# Patient Record
Sex: MALE | Race: WHITE | Employment: FULL TIME | URBAN - METROPOLITAN AREA
[De-identification: names, ages, dates, MRNs, and addresses within clinical notes are randomized per-mention and may not be internally consistent; named-entity substitution may affect disease eponyms.]

---

## 2019-05-03 ENCOUNTER — TELEPHONE (OUTPATIENT)
Dept: ADMINISTRATIVE | Age: 29
End: 2019-05-03

## 2019-06-03 ENCOUNTER — TELEPHONE (OUTPATIENT)
Dept: PRIMARY CARE CLINIC | Age: 29
End: 2019-06-03

## 2019-07-01 ENCOUNTER — OFFICE VISIT (OUTPATIENT)
Dept: PRIMARY CARE CLINIC | Age: 29
End: 2019-07-01
Payer: COMMERCIAL

## 2019-07-01 VITALS
HEIGHT: 76 IN | HEART RATE: 98 BPM | SYSTOLIC BLOOD PRESSURE: 122 MMHG | BODY MASS INDEX: 18.88 KG/M2 | WEIGHT: 155 LBS | OXYGEN SATURATION: 98 % | DIASTOLIC BLOOD PRESSURE: 60 MMHG

## 2019-07-01 DIAGNOSIS — F41.9 ANXIETY: Primary | ICD-10-CM

## 2019-07-01 DIAGNOSIS — Z00.00 HEALTH MAINTENANCE EXAMINATION: ICD-10-CM

## 2019-07-01 DIAGNOSIS — J45.20 MILD INTERMITTENT ASTHMA WITHOUT COMPLICATION: ICD-10-CM

## 2019-07-01 DIAGNOSIS — J30.89 SEASONAL ALLERGIC RHINITIS DUE TO OTHER ALLERGIC TRIGGER: ICD-10-CM

## 2019-07-01 DIAGNOSIS — Z80.0 FAMILY HISTORY OF COLON CANCER: ICD-10-CM

## 2019-07-01 PROBLEM — J30.9 ALLERGIC RHINITIS DUE TO ALLERGEN: Status: ACTIVE | Noted: 2019-07-01

## 2019-07-01 PROCEDURE — 99214 OFFICE O/P EST MOD 30 MIN: CPT | Performed by: FAMILY MEDICINE

## 2019-07-01 RX ORDER — FLUOXETINE 10 MG/1
10 CAPSULE ORAL DAILY
Qty: 30 CAPSULE | Refills: 1 | Status: SHIPPED | OUTPATIENT
Start: 2019-07-01 | End: 2019-07-29 | Stop reason: SDUPTHER

## 2019-07-01 RX ORDER — ALBUTEROL SULFATE 90 UG/1
AEROSOL, METERED RESPIRATORY (INHALATION)
Qty: 1 INHALER | Refills: 0 | Status: SHIPPED | OUTPATIENT
Start: 2019-07-01

## 2019-07-01 NOTE — ASSESSMENT & PLAN NOTE
He is concern for asthma versus panic attacks. Other differential reviewed. Declines PPD, chest imaging, cardiac testing, pulmonary referral or PFTs now. Would like to start fluoxetine as above and a trial of albuterol as directed as needed. Differential reviewed. Precautions reviewed.

## 2019-07-01 NOTE — ASSESSMENT & PLAN NOTE
Mom was in her early 46s among other family members later. Is going to plan anoscopy around age 34 and less symptoms. Signs and symptoms watch were reviewed. Declines FIT cards.

## 2019-07-01 NOTE — PROGRESS NOTES
or exudate. Posterior pharynx is normal.  Neck: Supple. Palpation reveals no adenopathy. No masses appreciated. No JVD. Carotids: no  bruits. Resp: Respirations are unlabored. Clear to auscultation. No rales, rhonchi or wheezes appreciated  over the lungs bilaterally. CV: Rate is regular. Rhythm is regular. No gallop or rubs. No heart murmur appreciated. Extremities: No clubbing, cyanosis, or edema. No calf inflammation or tenderness. Abdomen: Bowel sounds are normoactive. Abdomen is soft, nontender, and nondistended. No  abdominal masses. No palpable hepatosplenomegaly. Lymph: No palpable or visible regional lymphadenopathy. Musculoskeletal: no acute joint inflammation. Skin: Dry and warm with no rash. Skin normal to inspection and palpation overall. Neuro: Alert and oriented. Affect: appropriate. Upper Extremities: 5/5 bilaterally. Lower Extremities:  5/5 bilaterally. Sensation intact to light touch. Reflexes: DTR's are symmetric and 2+ bilaterally. .  Cranial Nerves: Cranial nerves grossly intact. Assessment and Plan:   Diagnosis Orders   1. Anxiety  CBC Auto Differential    Comprehensive Metabolic Panel    TSH without Reflex    T4, Free    Urinalysis    FLUoxetine (PROZAC) 10 MG capsule   2. Mild intermittent asthma without complication  CBC Auto Differential    Comprehensive Metabolic Panel    TSH without Reflex    T4, Free    Urinalysis    albuterol sulfate  (90 Base) MCG/ACT inhaler   3. Health maintenance examination  CBC Auto Differential    Comprehensive Metabolic Panel    TSH without Reflex    T4, Free    Urinalysis   4. Family history of colon cancer     5. Seasonal allergic rhinitis due to other allergic trigger         Anxiety  Anxiety and OCD. Did very well on low-dose fluoxetine in the past.  He would like to restart 10 mg with precautions. We will titrate as necessary. Risks reviewed including paradoxical effect, sexual, prolonged QT, SIADH.   He finds counseling

## 2019-07-26 ENCOUNTER — HOSPITAL ENCOUNTER (OUTPATIENT)
Age: 29
Discharge: HOME | End: 2019-07-28
Payer: COMMERCIAL

## 2019-07-26 DIAGNOSIS — Z00.00 HEALTH MAINTENANCE EXAMINATION: ICD-10-CM

## 2019-07-26 DIAGNOSIS — F41.9 ANXIETY: ICD-10-CM

## 2019-07-26 DIAGNOSIS — J45.20 MILD INTERMITTENT ASTHMA WITHOUT COMPLICATION: ICD-10-CM

## 2019-07-26 LAB
ALBUMIN SERPL-MCNC: 4.6 G/DL (ref 3.5–5.2)
ALP BLD-CCNC: 44 U/L (ref 40–129)
ALT SERPL-CCNC: 16 U/L (ref 0–40)
ANION GAP SERPL CALCULATED.3IONS-SCNC: 17 MMOL/L (ref 7–16)
AST SERPL-CCNC: 16 U/L (ref 0–39)
BASOPHILS ABSOLUTE: 0.03 E9/L (ref 0–0.2)
BASOPHILS RELATIVE PERCENT: 0.6 % (ref 0–2)
BILIRUB SERPL-MCNC: 0.6 MG/DL (ref 0–1.2)
BILIRUBIN URINE: NEGATIVE
BLOOD, URINE: NEGATIVE
BUN BLDV-MCNC: 12 MG/DL (ref 6–20)
CALCIUM SERPL-MCNC: 9.4 MG/DL (ref 8.6–10.2)
CHLORIDE BLD-SCNC: 101 MMOL/L (ref 98–107)
CLARITY: CLEAR
CO2: 23 MMOL/L (ref 22–29)
COLOR: YELLOW
CREAT SERPL-MCNC: 1 MG/DL (ref 0.7–1.2)
EOSINOPHILS ABSOLUTE: 0.24 E9/L (ref 0.05–0.5)
EOSINOPHILS RELATIVE PERCENT: 4.6 % (ref 0–6)
GFR AFRICAN AMERICAN: >60
GFR NON-AFRICAN AMERICAN: >60 ML/MIN/1.73
GLUCOSE BLD-MCNC: 99 MG/DL (ref 74–99)
GLUCOSE URINE: NEGATIVE MG/DL
HCT VFR BLD CALC: 46.2 % (ref 37–54)
HEMOGLOBIN: 15.4 G/DL (ref 12.5–16.5)
IMMATURE GRANULOCYTES #: 0.01 E9/L
IMMATURE GRANULOCYTES %: 0.2 % (ref 0–5)
KETONES, URINE: NEGATIVE MG/DL
LEUKOCYTE ESTERASE, URINE: NEGATIVE
LYMPHOCYTES ABSOLUTE: 2.26 E9/L (ref 1.5–4)
LYMPHOCYTES RELATIVE PERCENT: 43.5 % (ref 20–42)
MCH RBC QN AUTO: 31.9 PG (ref 26–35)
MCHC RBC AUTO-ENTMCNC: 33.3 % (ref 32–34.5)
MCV RBC AUTO: 95.7 FL (ref 80–99.9)
MONOCYTES ABSOLUTE: 0.57 E9/L (ref 0.1–0.95)
MONOCYTES RELATIVE PERCENT: 11 % (ref 2–12)
NEUTROPHILS ABSOLUTE: 2.08 E9/L (ref 1.8–7.3)
NEUTROPHILS RELATIVE PERCENT: 40.1 % (ref 43–80)
NITRITE, URINE: NEGATIVE
PDW BLD-RTO: 12.7 FL (ref 11.5–15)
PH UA: 6.5 (ref 5–9)
PLATELET # BLD: 189 E9/L (ref 130–450)
PMV BLD AUTO: 12.9 FL (ref 7–12)
POTASSIUM SERPL-SCNC: 4.3 MMOL/L (ref 3.5–5)
PROTEIN UA: NEGATIVE MG/DL
RBC # BLD: 4.83 E12/L (ref 3.8–5.8)
SODIUM BLD-SCNC: 141 MMOL/L (ref 132–146)
SPECIFIC GRAVITY UA: 1.01 (ref 1–1.03)
T4 FREE: 1.46 NG/DL (ref 0.93–1.7)
TOTAL PROTEIN: 6.6 G/DL (ref 6.4–8.3)
TSH SERPL DL<=0.05 MIU/L-ACNC: 1.1 UIU/ML (ref 0.27–4.2)
UROBILINOGEN, URINE: 0.2 E.U./DL
WBC # BLD: 5.2 E9/L (ref 4.5–11.5)

## 2019-07-26 PROCEDURE — 36415 COLL VENOUS BLD VENIPUNCTURE: CPT

## 2019-07-26 PROCEDURE — 81003 URINALYSIS AUTO W/O SCOPE: CPT

## 2019-07-26 PROCEDURE — 84439 ASSAY OF FREE THYROXINE: CPT

## 2019-07-26 PROCEDURE — 85025 COMPLETE CBC W/AUTO DIFF WBC: CPT

## 2019-07-26 PROCEDURE — 84443 ASSAY THYROID STIM HORMONE: CPT

## 2019-07-26 PROCEDURE — 80053 COMPREHEN METABOLIC PANEL: CPT

## 2019-07-29 ENCOUNTER — OFFICE VISIT (OUTPATIENT)
Dept: PRIMARY CARE CLINIC | Age: 29
End: 2019-07-29
Payer: COMMERCIAL

## 2019-07-29 VITALS
BODY MASS INDEX: 19.13 KG/M2 | DIASTOLIC BLOOD PRESSURE: 70 MMHG | HEART RATE: 68 BPM | WEIGHT: 157.2 LBS | SYSTOLIC BLOOD PRESSURE: 126 MMHG | OXYGEN SATURATION: 97 %

## 2019-07-29 DIAGNOSIS — J45.20 MILD INTERMITTENT ASTHMA WITHOUT COMPLICATION: ICD-10-CM

## 2019-07-29 DIAGNOSIS — Z00.00 HEALTH MAINTENANCE EXAMINATION: ICD-10-CM

## 2019-07-29 DIAGNOSIS — J30.89 SEASONAL ALLERGIC RHINITIS DUE TO OTHER ALLERGIC TRIGGER: ICD-10-CM

## 2019-07-29 DIAGNOSIS — Z80.0 FAMILY HISTORY OF COLON CANCER: ICD-10-CM

## 2019-07-29 DIAGNOSIS — F41.9 ANXIETY: Primary | ICD-10-CM

## 2019-07-29 PROCEDURE — 99214 OFFICE O/P EST MOD 30 MIN: CPT | Performed by: FAMILY MEDICINE

## 2019-07-29 RX ORDER — FLUOXETINE HYDROCHLORIDE 20 MG/1
20 CAPSULE ORAL DAILY
Qty: 30 CAPSULE | Refills: 3 | Status: SHIPPED | OUTPATIENT
Start: 2019-07-29 | End: 2019-12-02 | Stop reason: SDUPTHER

## 2019-07-29 NOTE — PROGRESS NOTES
19  Claude Arrieta : 1990 Sex: male  Age: 30 y.o. Chief Complaint   Patient presents with    Discuss Labs       HPI  HPI:    Feeling better on fluoxetine but only partial improvement of anxiety and OCD. Tolerating it well. He has had 2 episodes of difficulty breathing since seeing me, albuterol completely alleviated it. Now on Claritin and doing better overall. No other complaints or concerns. His mom was also diagnosed with WaldenStrom's lymphoma and counseled. She also has history of colon cancer. Blood work reviewed, overall stable. Lipid panel was not done and he declines having it drawn now-says he will wait until next year    Currently without symptoms with a negative review of systems as below    Review of Systems  ROS:  Const: Denies chills, fever, malaise and sweats. Eyes: Denies discharge, pain, redness and visual disturbance. ENMT: Denies earaches, other ear symptoms. Denies nasal or sinus symptoms other than stated  above. Denies mouth and tongue lesions and sore throat. CV: Denies chest discomfort, pain; diaphoresis, dizziness, edema, lightheadedness, orthopnea,  palpitations, syncope and near syncopal episode or any exertional symptoms  Resp: Denies cough, hemoptysis, pleuritic pain, SOB, sputum production and wheezing. GI: Denies abdominal pain, change in bowel habits, hematochezia, melena, nausea and vomiting. : Denies urinary symptoms including dysuria , urgency, frequency or hematuria. Musculo: Denies musculoskeletal symptoms. Skin: Denies bruising and rash.   Neuro: Denies headache, numbness, stiff neck, tingling and focal weakness slurred speech or facial  droop  Hema/Lymph: Denies bleeding/bruising tendency and enlarged lymph nodes        Current Outpatient Medications:     FLUoxetine (PROZAC) 20 MG capsule, Take 1 capsule by mouth daily, Disp: 30 capsule, Rfl: 3    albuterol sulfate  (90 Base) MCG/ACT inhaler, 1-2 puffs q4-6 hrs prn, Disp: 1

## 2019-07-31 PROBLEM — Z00.00 HEALTH MAINTENANCE EXAMINATION: Status: RESOLVED | Noted: 2019-07-01 | Resolved: 2019-07-31

## 2019-08-26 ENCOUNTER — OFFICE VISIT (OUTPATIENT)
Dept: FAMILY MEDICINE CLINIC | Age: 29
End: 2019-08-26
Payer: COMMERCIAL

## 2019-08-26 VITALS
OXYGEN SATURATION: 99 % | TEMPERATURE: 98.3 F | DIASTOLIC BLOOD PRESSURE: 70 MMHG | WEIGHT: 152 LBS | BODY MASS INDEX: 18.51 KG/M2 | SYSTOLIC BLOOD PRESSURE: 120 MMHG | HEIGHT: 76 IN | HEART RATE: 80 BPM

## 2019-08-26 DIAGNOSIS — J01.40 ACUTE NON-RECURRENT PANSINUSITIS: ICD-10-CM

## 2019-08-26 DIAGNOSIS — H10.32 ACUTE BACTERIAL CONJUNCTIVITIS OF LEFT EYE: Primary | ICD-10-CM

## 2019-08-26 PROCEDURE — 99213 OFFICE O/P EST LOW 20 MIN: CPT | Performed by: PHYSICIAN ASSISTANT

## 2019-08-26 RX ORDER — TOBRAMYCIN 3 MG/ML
SOLUTION/ DROPS OPHTHALMIC
Qty: 5 ML | Refills: 0 | Status: SHIPPED
Start: 2019-08-26 | End: 2020-05-13 | Stop reason: ALTCHOICE

## 2019-08-26 RX ORDER — DOXYCYCLINE HYCLATE 100 MG/1
100 CAPSULE ORAL 2 TIMES DAILY
Qty: 20 CAPSULE | Refills: 0 | Status: SHIPPED | OUTPATIENT
Start: 2019-08-26 | End: 2019-09-05

## 2019-12-02 DIAGNOSIS — F41.9 ANXIETY: ICD-10-CM

## 2019-12-03 RX ORDER — FLUOXETINE HYDROCHLORIDE 20 MG/1
20 CAPSULE ORAL DAILY
Qty: 30 CAPSULE | Refills: 1 | Status: SHIPPED | OUTPATIENT
Start: 2019-12-03 | End: 2020-01-31 | Stop reason: SDUPTHER

## 2020-05-11 ENCOUNTER — PATIENT MESSAGE (OUTPATIENT)
Dept: PRIMARY CARE CLINIC | Age: 30
End: 2020-05-11

## 2020-05-12 RX ORDER — FLUOXETINE HYDROCHLORIDE 20 MG/1
20 CAPSULE ORAL DAILY
Qty: 90 CAPSULE | Refills: 0 | Status: CANCELLED | OUTPATIENT
Start: 2020-05-12

## 2020-05-13 ENCOUNTER — VIRTUAL VISIT (OUTPATIENT)
Dept: PRIMARY CARE CLINIC | Age: 30
End: 2020-05-13
Payer: COMMERCIAL

## 2020-05-13 PROCEDURE — 99213 OFFICE O/P EST LOW 20 MIN: CPT | Performed by: FAMILY MEDICINE

## 2020-05-13 RX ORDER — FLUOXETINE HYDROCHLORIDE 20 MG/1
20 CAPSULE ORAL DAILY
Qty: 90 CAPSULE | Refills: 3 | Status: SHIPPED
Start: 2020-05-13 | End: 2021-05-21 | Stop reason: SDUPTHER

## 2020-05-13 ASSESSMENT — PATIENT HEALTH QUESTIONNAIRE - PHQ9
2. FEELING DOWN, DEPRESSED OR HOPELESS: 0
1. LITTLE INTEREST OR PLEASURE IN DOING THINGS: 0
SUM OF ALL RESPONSES TO PHQ9 QUESTIONS 1 & 2: 0
SUM OF ALL RESPONSES TO PHQ QUESTIONS 1-9: 0
SUM OF ALL RESPONSES TO PHQ QUESTIONS 1-9: 0

## 2020-05-13 NOTE — PROGRESS NOTES
cancer, mom diagnosed age 46, discussed starting to screen him age 34 or 43 younger if symptoms or concerns. No other complaints or concerns today. Most Recent Labs  CBC  Lab Results   Component Value Date    WBC 5.2 07/26/2019    RBC 4.83 07/26/2019    HGB 15.4 07/26/2019    HCT 46.2 07/26/2019    MCV 95.7 07/26/2019     07/26/2019      CMP  Lab Results   Component Value Date     07/26/2019    K 4.3 07/26/2019     07/26/2019    CO2 23 07/26/2019    ANIONGAP 17 07/26/2019    GLUCOSE 99 07/26/2019    BUN 12 07/26/2019    CREATININE 1.0 07/26/2019    LABGLOM >60 07/26/2019    GFRAA >60 07/26/2019    CALCIUM 9.4 07/26/2019    PROT 6.6 07/26/2019    LABALBU 4.6 07/26/2019    BILITOT 0.6 07/26/2019    ALKPHOS 44 07/26/2019    AST 16 07/26/2019    ALT 16 07/26/2019     A1C  No results found for: LABA1C  TSH  Lab Results   Component Value Date    TSH 1.100 07/26/2019     FREET4  No results found for: U7WFSWM  LIPID  No results found for: CHOL, HDL, LDLCALC, TRIG, CHOLHDLRATIO, VLDL  VITAMIN D  No results found for: VITD25  MAGNESIUM  No results found for: MG   PHOS  No results found for: PHOS   CHON   No results found for: CHON  RHEUMATOID FACTOR  No results found for: RF  PSA  No results found for: PSA   HEPATITIS C  No results found for: HCVABI  HIV  No results found for: CIH6MUN, HIV1QT  UA  Lab Results   Component Value Date    COLORU Yellow 07/26/2019    CLARITYU Clear 07/26/2019    GLUCOSEU Negative 07/26/2019    BILIRUBINUR Negative 07/26/2019    KETUA Negative 07/26/2019    SPECGRAV 1.010 07/26/2019    BLOODU Negative 07/26/2019    PHUR 6.5 07/26/2019    PROTEINU Negative 07/26/2019    UROBILINOGEN 0.2 07/26/2019    NITRU Negative 07/26/2019    LEUKOCYTESUR Negative 07/26/2019     Urine Micro/Albumin Ratio  No results found for: MALBCR        ROS:  Const: Denies chills, fever, malaise and sweats. Eyes: Denies discharge, pain, redness and visual disturbance.   ENMT: Denies earaches, other ear Denies    Surgical Hx:        strabismus        FH:    Father:    . (Hx)    Mother:    . (Hx)    Dad - CAD/CABG 43, bad eating habbits    Mom - colon cancer dx'd 46, waldenstroms lymphoma after    Maternal grandfather - colon cancer late 63's    maternal grandmother leaky heart valve later in life    Maternal uncle valve surgery upper 52's        SH:    . 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 Alcohol: Occasionally consumes alcohol    , 1yr old as of 2020                 PHYSICAL EXAMINATION:  [ INSTRUCTIONS:  \"[x]\" Indicates a positive item  \"[]\" Indicates a negative item  -- DELETE ALL ITEMS NOT EXAMINED]  Vital Signs: (As obtained by patient/caregiver or practitioner observation)    There were no vitals filed for this visit. Blood pressure-  Heart rate-    Respiratory rate-    Temperature-  Pulse oximetry-     Constitutional: [x] Appears well-developed and well-nourished [x] No apparent distress      [] Abnormal-   Mental status  [x] Alert and awake  [x] Oriented to person/place/time [x]Able to follow commands      Eyes:  EOM    [x]  Normal  [] Abnormal-  Sclera  [x]  Normal  [] Abnormal -         Discharge [x]  None visible  [] Abnormal -    HENT:   [x] Normocephalic, atraumatic.   [] Abnormal   [x] Mouth/Throat: Mucous membranes are moist.     External Ears [x] Normal  [] Abnormal-     Neck: [x] No visualized mass     Pulmonary/Chest: [x] Respiratory effort normal.  [x] No visualized signs of difficulty breathing or respiratory distress        [] Abnormal-      Musculoskeletal:   [x] Normal gait with no signs of ataxia         [x] Normal range of motion of neck        [] Abnormal-       Neurological:        [x] No Facial Asymmetry (Cranial nerve 7 motor function) (limited exam to video visit)          [x] No gaze palsy        [] Abnormal- Skin:        [x] No significant exanthematous lesions or discoloration noted on facial skin         [] Abnormal-            Psychiatric:       [x] Normal Affect [x] No Hallucinations        [] Abnormal-     Other pertinent observable physical exam findings-     ASSESSMENT/PLAN:   Diagnosis Orders   1. Anxiety  FLUoxetine (PROZAC) 20 MG capsule   2. Mild intermittent asthma without complication     3. Family history of colon cancer     4. Seasonal allergic rhinitis due to other allergic trigger         No problem-specific Assessment & Plan notes found for this encounter. Anxiety  Anxiety and OCD.     Doing very well on fluoxetine 20 mg nightly. Risks reviewed including paradoxical effect, sexual, prolonged QT, SIADH.  He finds counseling counterproductive.       Refill givenMild intermittent asthma without complication  Symptoms completely resolve on as needed albuterol that he uses very infrequently. And really having no symptoms long as he takes Claritin daily. Declined PPD despite travels, chest imaging cardiac testing pulmonary referral or PFTs. Seems to be a combination of asthma and various causes reviewed as well as possibly panic attacks. Declines other evaluation treatment now.     Health maintenance examination  Encouraged yearly physicals, last completed 9/17. Is going to schedule for about 3 months sooner as needed. He will get blood work before after he clears with insurance.     Family history of colon cancer  Mom was in her early 46s among other family members later. Elías Slider going to plan anoscopy around age 34 unless symptoms.  Signs and symptoms watch were reviewed.  Declines FIT cards.     Allergic rhinitis due to allergen  Doing better with Claritin daily as needed. Discussed nasal steroids and nasal saline as well. Counseled extensively and differential diagnoses of above were reviewed, including serious etiologies. Side effects and interactions of medications were reviewed.

## 2021-05-21 DIAGNOSIS — F41.9 ANXIETY: ICD-10-CM

## 2021-05-21 RX ORDER — FLUOXETINE HYDROCHLORIDE 20 MG/1
20 CAPSULE ORAL DAILY
Qty: 90 CAPSULE | Refills: 0 | Status: SHIPPED
Start: 2021-05-21 | End: 2021-05-24 | Stop reason: SDUPTHER

## 2021-05-24 ENCOUNTER — OFFICE VISIT (OUTPATIENT)
Dept: PRIMARY CARE CLINIC | Age: 31
End: 2021-05-24
Payer: OTHER GOVERNMENT

## 2021-05-24 VITALS
WEIGHT: 168 LBS | DIASTOLIC BLOOD PRESSURE: 84 MMHG | SYSTOLIC BLOOD PRESSURE: 138 MMHG | HEIGHT: 76 IN | OXYGEN SATURATION: 98 % | BODY MASS INDEX: 20.46 KG/M2 | HEART RATE: 83 BPM | TEMPERATURE: 97.4 F

## 2021-05-24 DIAGNOSIS — F41.9 ANXIETY: Primary | ICD-10-CM

## 2021-05-24 DIAGNOSIS — E55.9 VITAMIN D DEFICIENCY: ICD-10-CM

## 2021-05-24 DIAGNOSIS — Z80.0 FAMILY HISTORY OF COLON CANCER: ICD-10-CM

## 2021-05-24 DIAGNOSIS — Z00.00 HEALTH MAINTENANCE EXAMINATION: ICD-10-CM

## 2021-05-24 PROCEDURE — 99213 OFFICE O/P EST LOW 20 MIN: CPT | Performed by: FAMILY MEDICINE

## 2021-05-24 RX ORDER — FLUOXETINE 10 MG/1
10 CAPSULE ORAL DAILY
Qty: 30 CAPSULE | Refills: 1 | Status: SHIPPED | OUTPATIENT
Start: 2021-05-24

## 2021-05-24 ASSESSMENT — PATIENT HEALTH QUESTIONNAIRE - PHQ9
2. FEELING DOWN, DEPRESSED OR HOPELESS: 0
SUM OF ALL RESPONSES TO PHQ QUESTIONS 1-9: 0

## 2021-05-24 NOTE — PROGRESS NOTES
19  [facility] : 1990 Sex: male  Age: 33 y.o. Chief Complaint   Patient presents with    Discuss Medications     would like to decrease dose of fluoxetine    Anxiety       HPI  HPI:    Patient presents today for follow-up, would like to consider stopping fluoxetine. Been on for few years for anxiety and OCD. Felt it was life stressors at the time. Things are going well now. He is interested in trying to stop it. Also some questions regarding vitamins. History of vitamin D deficiency. Counseled on this and multivitamin. His mom was also diagnosed with WaldenStrom's lymphoma and previously counseled. She also has history of colon cancer. Last blood work   Currently without symptoms with a negative review of systems as below    Review of Systems  ROS:  Const: Denies chills, fever, malaise and sweats. Eyes: Denies discharge, pain, redness and visual disturbance. ENMT: Denies earaches, other ear symptoms. Denies nasal or sinus symptoms other than stated  above. Denies mouth and tongue lesions and sore throat. CV: Denies chest discomfort, pain; diaphoresis, dizziness, edema, lightheadedness, orthopnea,  palpitations, syncope and near syncopal episode or any exertional symptoms  Resp: Denies cough, hemoptysis, pleuritic pain, SOB, sputum production and wheezing. GI: Denies abdominal pain, change in bowel habits, hematochezia, melena, nausea and vomiting. : Denies urinary symptoms including dysuria , urgency, frequency or hematuria. Musculo: Denies musculoskeletal symptoms. Skin: Denies bruising and rash.   Neuro: Denies headache, numbness, stiff neck, tingling and focal weakness slurred speech or facial  droop  Hema/Lymph: Denies bleeding/bruising tendency and enlarged lymph nodes        Current Outpatient Medications:     FLUoxetine (PROZAC) 10 MG capsule, Take 1 capsule by mouth daily, Disp: 30 capsule, Rfl: 1    albuterol sulfate  (90 Base) MCG/ACT inhaler, 1-2 puffs q4-6 hrs prn, Disp: 1 Inhaler, Rfl: 0  Allergies   Allergen Reactions    Penicillins Other (See Comments)     Diagnosed when a child. Can not confirm reaction.  Sulfa Antibiotics        History reviewed. No pertinent past medical history. Past Surgical History:   Procedure Laterality Date    STRABISMUS SURGERY       Family History   Problem Relation Age of Onset    Coronary Art Dis Father     Heart Surgery Father         CABG    Cancer Mother         Colon    Other Maternal Grandmother         Leaky Heart Valve later in life    Other Maternal Uncle         Valve Surgery    Colon Cancer Maternal Grandfather      Social History     Tobacco Use    Smoking status: Never Smoker    Smokeless tobacco: Never Used   Substance Use Topics    Alcohol use: Yes     Comment: Occasionally    Drug use: Not on file      Social History     Social History Narrative    PMH:    Problem List: Food poisoning    (Childhood Illness)    Medical Problems:    Denies    Surgical Hx:        strabismus        FH:    Father:    . (Hx)    Mother:    . (Hx)    Dad - CAD/CABG 43, bad eating habbits    Mom - colon cancer dx'd 46, waldenstroms lymphoma after    Maternal grandfather - colon cancer late 63's    maternal grandmother leaky heart valve later in life    Maternal uncle valve surgery upper 52's        SH:    . 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 Alcohol: Occasionally consumes alcohol    , 1yr old as of 24        Vitals:    05/24/21 1503   BP: 138/84   Pulse: 83   Temp: 97.4 °F (36.3 °C)   SpO2: 98%   Weight: 168 lb (76.2 kg)   Height: 6' 4\" (1.93 m)      Wt Readings from Last 3 Encounters:   05/24/21 168 lb (76.2 kg)   08/26/19 152 lb (68.9 kg)   07/29/19 157 lb 3.2 oz (71.3 kg)        Physical Exam  Exam:  Const: Appears comfortable.  No signs of acute doing well, feels life stressors were likely contributing and he is interested in stopping it. We discussed lowering it to 10 mg for 2 to 4 weeks and then every other day for a week and then stopping it, resuming lowest effective dose    Health maintenance examination  Encouraged yearly physicals, last completed 9/17. Is going to schedule for about 2 months sooner as needed. He will get blood work before after he clears with insurance.     Family history of colon cancer  Mom was in her early 46s among other family members later. Luisa Garcia going to plan anoscopy around age 34 unless symptoms.  Signs and symptoms watch were reviewed.  Declines FIT cards.       No flowsheet data found. Plan as above. Counseled extensively and differential diagnoses relevant to above were reviewed, including serious etiologies. Side effects and interactions of medications were reviewed. wean fluoxetine. Check blood work. Follow-up 2 months sooner as needed. As long as symptoms steadily improve/resolve, and medical conditions follow the expected course, declines follow-up before 3 weeks, sooner PRN. yEarly blood work also ordered    Return for physical.         Signs and symptoms to watch for discussed, serious signs and symptoms reviewed. ER if any. Gloria Turcios MD    Patients are advised to check with insurance company to ensure coverage and to fully understand benefits and cost prior to any testing. This note was created with the assistance of voice recognition software. Document was reviewed however may contain grammatical errors.

## 2023-01-28 NOTE — TELEPHONE ENCOUNTER
From: Monie Worrell  To: Nadre Wright MD  Sent: 5/11/2020 10:25 PM EDT  Subject: Prescription Question    Is it possible to get a 90 day or more refill for my Fluvox prescription on my next refill? The 20 mg has been steady and working fine for the past months. Thanks.
scheduled for 5/13/2020.  Video
full weight-bearing